# Patient Record
Sex: MALE | Race: WHITE | NOT HISPANIC OR LATINO | URBAN - NONMETROPOLITAN AREA
[De-identification: names, ages, dates, MRNs, and addresses within clinical notes are randomized per-mention and may not be internally consistent; named-entity substitution may affect disease eponyms.]

---
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
## 2021-04-05 ENCOUNTER — APPOINTMENT (OUTPATIENT)
Dept: GENERAL RADIOLOGY | Facility: HOSPITAL | Age: 21
End: 2021-04-05

## 2021-04-05 ENCOUNTER — HOSPITAL ENCOUNTER (EMERGENCY)
Facility: HOSPITAL | Age: 21
Discharge: HOME | End: 2021-04-05
Attending: STUDENT IN AN ORGANIZED HEALTH CARE EDUCATION/TRAINING PROGRAM | Admitting: STUDENT IN AN ORGANIZED HEALTH CARE EDUCATION/TRAINING PROGRAM

## 2021-04-05 VITALS
DIASTOLIC BLOOD PRESSURE: 83 MMHG | WEIGHT: 170 LBS | RESPIRATION RATE: 18 BRPM | SYSTOLIC BLOOD PRESSURE: 134 MMHG | BODY MASS INDEX: 26.68 KG/M2 | TEMPERATURE: 99.2 F | OXYGEN SATURATION: 98 % | HEIGHT: 67 IN | HEART RATE: 78 BPM

## 2021-04-05 DIAGNOSIS — S91.011A LACERATION OF RIGHT ANKLE, INITIAL ENCOUNTER: ICD-10-CM

## 2021-04-05 DIAGNOSIS — S99.911A INJURY OF RIGHT ANKLE, INITIAL ENCOUNTER: Primary | ICD-10-CM

## 2021-04-05 PROCEDURE — 90715 TDAP VACCINE 7 YRS/> IM: CPT | Performed by: STUDENT IN AN ORGANIZED HEALTH CARE EDUCATION/TRAINING PROGRAM

## 2021-04-05 PROCEDURE — 73590 X-RAY EXAM OF LOWER LEG: CPT

## 2021-04-05 PROCEDURE — 73630 X-RAY EXAM OF FOOT: CPT

## 2021-04-05 PROCEDURE — 25010000002 TDAP 5-2.5-18.5 LF-MCG/0.5 SUSPENSION: Performed by: STUDENT IN AN ORGANIZED HEALTH CARE EDUCATION/TRAINING PROGRAM

## 2021-04-05 PROCEDURE — 90471 IMMUNIZATION ADMIN: CPT | Performed by: STUDENT IN AN ORGANIZED HEALTH CARE EDUCATION/TRAINING PROGRAM

## 2021-04-05 PROCEDURE — 99282 EMERGENCY DEPT VISIT SF MDM: CPT

## 2021-04-05 PROCEDURE — 73610 X-RAY EXAM OF ANKLE: CPT

## 2021-04-05 RX ORDER — LIDOCAINE HYDROCHLORIDE AND EPINEPHRINE 10; 10 MG/ML; UG/ML
10 INJECTION, SOLUTION INFILTRATION; PERINEURAL ONCE
Status: COMPLETED | OUTPATIENT
Start: 2021-04-05 | End: 2021-04-05

## 2021-04-05 RX ORDER — KETOROLAC TROMETHAMINE 10 MG/1
10 TABLET, FILM COATED ORAL EVERY 6 HOURS PRN
Qty: 12 TABLET | Refills: 0 | Status: SHIPPED | OUTPATIENT
Start: 2021-04-05

## 2021-04-05 RX ADMIN — LIDOCAINE HYDROCHLORIDE AND EPINEPHRINE 10 ML: 10; 10 INJECTION, SOLUTION INFILTRATION; PERINEURAL at 21:18

## 2021-04-05 RX ADMIN — TETANUS TOXOID, REDUCED DIPHTHERIA TOXOID AND ACELLULAR PERTUSSIS VACCINE, ADSORBED 0.5 ML: 5; 2.5; 8; 8; 2.5 SUSPENSION INTRAMUSCULAR at 20:28

## 2021-04-06 NOTE — ED PROVIDER NOTES
Subjective   21-year-old male comes to the ER with an injury to his right ankle.  Patient is a  by trade and slipped off a branch earlier this afternoon.  His spiked shoes caused a cut on his right foot.  Tetanus is not up-to-date.  No weakness or numbness.  Patient is able to ambulate on the foot, but it is painful.  Bleeding is well controlled prior to arrival.      History provided by:  Spouse and patient   used: No        Review of Systems   Constitutional: Positive for activity change. Negative for appetite change, chills, diaphoresis, fatigue and fever.   HENT: Negative for congestion and rhinorrhea.    Respiratory: Negative for cough and shortness of breath.    Cardiovascular: Negative for chest pain and palpitations.   Gastrointestinal: Negative for abdominal pain and nausea.   Genitourinary: Negative for dysuria and flank pain.   Musculoskeletal:        Right ankle pain   Skin: Positive for wound. Negative for color change and rash.   Neurological: Negative for dizziness, syncope, weakness, light-headedness, numbness and headaches.   Psychiatric/Behavioral: Negative for agitation. The patient is not nervous/anxious.        History reviewed. No pertinent past medical history.    No Known Allergies    History reviewed. No pertinent surgical history.    History reviewed. No pertinent family history.    Social History     Socioeconomic History   • Marital status:      Spouse name: Not on file   • Number of children: Not on file   • Years of education: Not on file   • Highest education level: Not on file   Tobacco Use   • Smoking status: Current Some Day Smoker     Years: 3.00   • Smokeless tobacco: Current User   Substance and Sexual Activity   • Alcohol use: No   • Drug use: No   • Sexual activity: Defer           Objective    Vitals:    04/05/21 1935 04/05/21 2127   BP: 147/81 134/83   BP Location: Right arm    Patient Position: Sitting    Pulse: 105 78   Resp: 18   "  Temp: 99.2 °F (37.3 °C)    TempSrc: Oral    SpO2: 95% 98%   Weight: 77.1 kg (170 lb)    Height: 170.2 cm (67\")        Physical Exam  Vitals and nursing note reviewed.   Constitutional:       General: He is not in acute distress.     Appearance: He is well-developed. He is not diaphoretic.   HENT:      Head: Normocephalic and atraumatic.      Right Ear: External ear normal.      Left Ear: External ear normal.      Nose: No rhinorrhea.   Eyes:      Conjunctiva/sclera: Conjunctivae normal.   Neck:      Trachea: Trachea normal.   Pulmonary:      Effort: Pulmonary effort is normal. No accessory muscle usage or respiratory distress.   Musculoskeletal:         General: Tenderness, deformity and signs of injury present. No swelling. Normal range of motion.      Cervical back: Normal range of motion.   Skin:     General: Skin is warm and dry.      Capillary Refill: Capillary refill takes less than 2 seconds.      Findings: Laceration present.          Neurological:      Mental Status: He is alert and oriented to person, place, and time.      Sensory: No sensory deficit.      Motor: No weakness.      Gait: Gait normal.   Psychiatric:         Behavior: Behavior normal.         Laceration Repair    Date/Time: 4/5/2021 10:47 PM  Performed by: Shai Gibbs MD  Authorized by: Shai Gibbs MD     Consent:     Consent obtained:  Verbal    Consent given by:  Patient    Risks discussed:  Infection, poor wound healing, poor cosmetic result, pain, retained foreign body and nerve damage    Alternatives discussed:  No treatment, delayed treatment and observation  Anesthesia (see MAR for exact dosages):     Anesthesia method:  Local infiltration    Local anesthetic:  Lidocaine 1% WITH epi  Laceration details:     Location:  Leg    Leg location:  R lower leg    Length (cm):  2.5  Repair type:     Repair type:  Simple  Pre-procedure details:     Preparation:  Patient was prepped and draped in usual sterile fashion and imaging " obtained to evaluate for foreign bodies  Exploration:     Hemostasis achieved with:  Direct pressure and epinephrine    Wound exploration: entire depth of wound probed and visualized      Contaminated: no    Treatment:     Area cleansed with:  Hibiclens    Amount of cleaning:  Extensive    Irrigation solution:  Sterile saline    Irrigation method:  Pressure wash    Visualized foreign bodies/material removed: no    Skin repair:     Repair method:  Sutures    Suture size:  5-0    Suture material:  Prolene    Suture technique:  Simple interrupted    Number of sutures:  4  Approximation:     Approximation:  Close  Post-procedure details:     Dressing:  Non-adherent dressing    Patient tolerance of procedure:  Tolerated well, no immediate complications               ED Course      XR Tibia Fibula 2 View Right   Final Result   Negative examination for age.         Note:  if pain or symptoms persist beyond reasonable expectations      and follow-up imaging is anticipated,  cross sectional imaging    (CT and/or MRI) is suggested, as is deemed clinically    appropriate.      Electronically signed by:  Krystal Pete MD  4/5/2021 9:00 PM CDT   Workstation: [id number]      XR Foot 3+ View Right   Final Result   No acute osseous abnormality. Soft tissue defect   posterior to the distal tibia, compatible with reported injury.   No radiopaque foreign body identified.         Note:  if pain or symptoms persist beyond reasonable expectations      and follow-up imaging is anticipated,  cross sectional imaging    (CT and/or MRI) is suggested, as is deemed clinically    appropriate.      Electronically signed by:  Krystal Pete MD  4/5/2021 9:02 PM CDT   Workstation: [id number]      XR Ankle 3+ View Right   Final Result   Soft tissue defect as above, without acute osseous   abnormality identified. No radiopaque foreign body is seen..         Note:  if pain or symptoms persist beyond reasonable expectations      and follow-up imaging is  anticipated,  cross sectional imaging    (CT and/or MRI) is suggested, as is deemed clinically    appropriate.      Electronically signed by:  Krystal Pete MD  4/5/2021 8:40 PM CDT   Workstation: [id number]                                             MDM  Number of Diagnoses or Management Options  Injury of right ankle, initial encounter: new and requires workup  Laceration of right ankle, initial encounter: new and requires workup  Diagnosis management comments: Vital signs are stable, afebrile. Neurovascularly intact.  Tetanus updated.  Imaging negative for pathology or foreign bodies. Laceration repaired after cleaning. On reevaluation, patient is alert and resting comfortably. I discussed the results of the emergency department evaluation.  I recommended primary care follow-up.  Return precautions discussed.       Amount and/or Complexity of Data Reviewed  Tests in the radiology section of CPT®: reviewed and ordered  Decide to obtain previous medical records or to obtain history from someone other than the patient: yes  Obtain history from someone other than the patient: yes  Review and summarize past medical records: yes  Independent visualization of images, tracings, or specimens: yes    Patient Progress  Patient progress: improved      Final diagnoses:   Injury of right ankle, initial encounter   Laceration of right ankle, initial encounter       ED Disposition  ED Disposition     ED Disposition Condition Comment    Discharge Stable           [facility]  [address] [place] [id number] [phone]  Schedule an appointment as soon as possible for a visit in 2 days  ER follow up, For wound re-check         Medication List      New Prescriptions    ketorolac 10 MG tablet  Commonly known as: TORADOL  Take 1 tablet by mouth Every 6 (Six) Hours As Needed for Moderate Pain .           Where to Get Your Medications      You can get these medications from any pharmacy     Bring a paper prescription for each of these medications  · ketorolac 10 MG tablet          Shai Gibbs MD  04/05/21 0102

## 2021-04-06 NOTE — DISCHARGE INSTRUCTIONS
How can you care for yourself at home?  Keep the wound clean and dry for the first 12-24 hours.  The wound has probably had a bandage applied (unless skin glue was used, or if the wound was in a hard-to-bandage area, like your hair).  You can remove the bandage after 12-24 hours at your convenience.  After this, no further dressings are necessary, unless advised differently today.  You can clean the area with a mild soap and water 2 times a day. Don't use hydrogen peroxide, iodine-based solutions, or alcohol, which can slow healing, and will probably be painful!  You may cover the wound with a thin layer of antibiotic ointment, such as bacitracin or Neosporin.  Be advised that some people can develop allergic skin rashes to topical antibiotics, especially neomycin (a component of Neosporin) so you should avoid this if you have a known sensitivity.  You can continue to apply the antibiotic twice daily until the wound scars and dries out, or the sutures/staples are removed.    When should you call for help?  Call your doctor now, or seek immediate medical care in an emergency department if:  Your wound is causing new pain, or the pain gets worse.  Some pain is normal with a wound, but do not ignore pain that is getting worse instead of better. You could have an infection.  The cut starts to bleed, and blood soaks through the bandage. Oozing small amounts of blood is normal.  The skin near the cut is cold or pale or changes color.  You have tingling, weakness, or numbness near the cut that we did not address during your visit.  You have trouble moving the area near the cut that we did not address during the visit.  You have increased pain, swelling, warmth, or redness around the cut, which could be a sign of infection.  There are red streaks leading from the cut, or there is pus draining from the cut.  You have a fever that you can't explain for another reason (like having a common cold).      What else do I need to  know/do?  Being treated in the emergency room is only one step in your treatment. Even if you feel better, you still need to go to all suggested follow-up appointments and take medicines exactly as directed. This will help you recover and help prevent future problems.  Follow-up care is a key part of your treatment and safety.  Be sure to make and go to all appointments, and call your doctor if things are not going as expected.  If you've been prescribed antibiotics, take them as directed.  Do not stop taking them just because you feel better. If you don't finish the course, you can breed resistant infections, which are harder to treat!  Take good care of your wound at home to help it heal quickly and reduce your chance of infection. While your wound is healing, avoid any activity that could cause your wound to reopen.  Use common sense when it comes to activities.  Also, avoid unnecessary, copious bacteria exposure-for example, swimming in an ocean or hot tub, or wearing shoes or gloves over a wound for a long period of time (which promotes bacterial growth).    Return with any new or worsening symptoms, or any concerns.